# Patient Record
Sex: FEMALE | Race: OTHER | URBAN - METROPOLITAN AREA
[De-identification: names, ages, dates, MRNs, and addresses within clinical notes are randomized per-mention and may not be internally consistent; named-entity substitution may affect disease eponyms.]

---
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
## 2024-01-19 ENCOUNTER — OFFICE VISIT (OUTPATIENT)
Age: 24
End: 2024-01-19
Payer: COMMERCIAL

## 2024-01-19 VITALS
DIASTOLIC BLOOD PRESSURE: 70 MMHG | SYSTOLIC BLOOD PRESSURE: 110 MMHG | HEIGHT: 59 IN | WEIGHT: 148 LBS | BODY MASS INDEX: 29.84 KG/M2

## 2024-01-19 DIAGNOSIS — Z01.419 ENCOUNTER FOR GYNECOLOGICAL EXAMINATION WITHOUT ABNORMAL FINDING: Primary | ICD-10-CM

## 2024-01-19 DIAGNOSIS — Z72.51 UNPROTECTED SEXUAL INTERCOURSE: ICD-10-CM

## 2024-01-19 DIAGNOSIS — N63.0 BREAST LUMP IN FEMALE: ICD-10-CM

## 2024-01-19 DIAGNOSIS — Z11.3 SCREENING FOR STD (SEXUALLY TRANSMITTED DISEASE): ICD-10-CM

## 2024-01-19 LAB
HBV SURFACE AG SER QL: <0.1 INDEX
HBV SURFACE AG SER QL: NEGATIVE
HCV AB SER IA-ACNC: 0.16 INDEX
HCV AB SERPL QL IA: NONREACTIVE
HIV 1+2 AB+HIV1 P24 AG SERPL QL IA: NONREACTIVE
HIV 1/2 RESULT COMMENT: NORMAL

## 2024-01-19 PROCEDURE — 99385 PREV VISIT NEW AGE 18-39: CPT | Performed by: OBSTETRICS & GYNECOLOGY

## 2024-01-19 NOTE — PROGRESS NOTES
Annual Exam    Chief Complaint   Patient presents with    New Patient       Darleen Dotson is a 23 y.o. presenting for annual exam. She has never had a pelvic exam before. Her main concerns today include STD testing. She is sexually active with a male partner. They are not using contraception.     She also is worried about a breast lump in her left breast. She notes it has been there for about 4 years. The lump has not gotten bigger in that time. She states that it has also does not change with her cycles. She has no family history of breast cancer. She also notes an area in her right arm pit that is sunken in.    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    She declines a chaperone during the gynecologic exam today.     Ob/Gyn Hx:  G0  LMP - 01/04/2024  Menses - regular   Contraception - none. Does not want to discuss options.  STI - no history of STDs. Does want all testing today.  SA - Yes- male partner    Health maintenance:  Pap - never had one before.   Gardasil - series completed.    History reviewed. No pertinent past medical history.    History reviewed. No pertinent surgical history.    History reviewed. No pertinent family history.    Social History     Socioeconomic History    Marital status: Single     Spouse name: Not on file    Number of children: Not on file    Years of education: Not on file    Highest education level: Not on file   Occupational History    Not on file   Tobacco Use    Smoking status: Never    Smokeless tobacco: Never   Vaping Use    Vaping Use: Some days   Substance and Sexual Activity    Alcohol use: Yes    Drug use: Never    Sexual activity: Yes     Partners: Male     Birth control/protection: None   Other Topics Concern    Not on file   Social History Narrative    Not on file     Social Determinants of Health     Financial Resource Strain: Not on file   Food Insecurity: Not on file   Transportation Needs: Not on file

## 2024-01-21 LAB — T PALLIDUM AB SER QL IA: NON REACTIVE

## 2024-01-23 LAB
., LABCORP: NORMAL
C TRACH RRNA CVX QL NAA+PROBE: NEGATIVE
CYTOLOGIST CVX/VAG CYTO: NORMAL
CYTOLOGY CVX/VAG DOC CYTO: NORMAL
CYTOLOGY CVX/VAG DOC THIN PREP: NORMAL
DX ICD CODE: NORMAL
Lab: NORMAL
Lab: NORMAL
N GONORRHOEA RRNA CVX QL NAA+PROBE: NEGATIVE
OTHER STN SPEC: NORMAL
STAT OF ADQ CVX/VAG CYTO-IMP: NORMAL
T VAGINALIS RRNA SPEC QL NAA+PROBE: NEGATIVE

## 2024-04-30 ENCOUNTER — TELEPHONE (OUTPATIENT)
Age: 24
End: 2024-04-30

## 2024-05-06 DIAGNOSIS — N60.02 BREAST CYST, LEFT: Primary | ICD-10-CM

## 2024-08-12 ENCOUNTER — TELEPHONE (OUTPATIENT)
Age: 24
End: 2024-08-12

## 2024-08-12 NOTE — TELEPHONE ENCOUNTER
We have received a fax from [facility]. Regarding imaging and a left breast ultrasound that was done. Results scanned into media.     Please provide recommendations.

## 2024-08-12 NOTE — TELEPHONE ENCOUNTER
Called and left a message for the patient to return a call back to the office. Dr. Hamilton has reviewed the fax and is recommending the patient go to the breast center. The patient has moved to [place] since her last visit with Dr. Tiffany Hamilton.     If the patient calls back, please see if there is a breast center in [place] she would like us to send a referral to?

## 2024-11-23 NOTE — TELEPHONE ENCOUNTER
Called and left a message for the patient. She needs to have a new order placed and would like to have the imagining done in [place]. We need the fax number for where she would like to order sent to.   
Darleen call the office today requesting that an order for a Mammo be sent to the address listed below. She stated that she has since moved to [place] soon after her last visit with Dr. Hamilton before she was able to see the referred provider here in [place].    [address] [place] [address]    [address]  [facility], [id number]    
yes